# Patient Record
Sex: FEMALE | Race: WHITE | Employment: UNEMPLOYED | URBAN - NONMETROPOLITAN AREA
[De-identification: names, ages, dates, MRNs, and addresses within clinical notes are randomized per-mention and may not be internally consistent; named-entity substitution may affect disease eponyms.]

---

## 2020-06-17 ENCOUNTER — OFFICE VISIT (OUTPATIENT)
Dept: ENT CLINIC | Age: 65
End: 2020-06-17
Payer: MEDICARE

## 2020-06-17 VITALS
BODY MASS INDEX: 19.14 KG/M2 | DIASTOLIC BLOOD PRESSURE: 88 MMHG | HEIGHT: 63 IN | WEIGHT: 108 LBS | SYSTOLIC BLOOD PRESSURE: 138 MMHG | RESPIRATION RATE: 12 BRPM | TEMPERATURE: 97.1 F | HEART RATE: 80 BPM

## 2020-06-17 PROBLEM — H61.23 BILATERAL IMPACTED CERUMEN: Status: ACTIVE | Noted: 2020-06-17

## 2020-06-17 PROBLEM — H61.21 HEARING LOSS OF RIGHT EAR DUE TO CERUMEN IMPACTION: Status: ACTIVE | Noted: 2020-06-17

## 2020-06-17 PROBLEM — D16.4 OSTEOMA OF EXTERNAL AUDITORY CANAL: Status: ACTIVE | Noted: 2020-06-17

## 2020-06-17 PROCEDURE — 69210 REMOVE IMPACTED EAR WAX UNI: CPT | Performed by: OTOLARYNGOLOGY

## 2020-06-17 PROCEDURE — 4004F PT TOBACCO SCREEN RCVD TLK: CPT | Performed by: OTOLARYNGOLOGY

## 2020-06-17 PROCEDURE — G8427 DOCREV CUR MEDS BY ELIG CLIN: HCPCS | Performed by: OTOLARYNGOLOGY

## 2020-06-17 PROCEDURE — 1090F PRES/ABSN URINE INCON ASSESS: CPT | Performed by: OTOLARYNGOLOGY

## 2020-06-17 PROCEDURE — 4040F PNEUMOC VAC/ADMIN/RCVD: CPT | Performed by: OTOLARYNGOLOGY

## 2020-06-17 PROCEDURE — 99205 OFFICE O/P NEW HI 60 MIN: CPT | Performed by: OTOLARYNGOLOGY

## 2020-06-17 PROCEDURE — 3017F COLORECTAL CA SCREEN DOC REV: CPT | Performed by: OTOLARYNGOLOGY

## 2020-06-17 PROCEDURE — G8420 CALC BMI NORM PARAMETERS: HCPCS | Performed by: OTOLARYNGOLOGY

## 2020-06-17 PROCEDURE — G8400 PT W/DXA NO RESULTS DOC: HCPCS | Performed by: OTOLARYNGOLOGY

## 2020-06-17 PROCEDURE — 1123F ACP DISCUSS/DSCN MKR DOCD: CPT | Performed by: OTOLARYNGOLOGY

## 2020-06-17 RX ORDER — DULOXETIN HYDROCHLORIDE 30 MG/1
30 CAPSULE, DELAYED RELEASE ORAL DAILY
COMMUNITY

## 2020-06-17 NOTE — PROGRESS NOTES
the right ear. It was dry and hard and difficult to remove but I was able to do so without traumatizing her ear canal skin. Beyond it I saw normal external canal with a mildly dull tympanic membrane but no pathology seen in the middle ear structures which I could still make out. She had no bleeding in her canal.  Turning my attention to the left side I removed a large area of inspissated tenacious cerumen could now see the extent of the osteomas into the middle ear space. They could be fourth osteoma with this improved view. The patient tolerated the procedure well. Vitals reviewed. No results found. No results found for: NA, K, CL, CO2, BUN, CREATININE, CALCIUM, PROT, LABALBU, BILITOT, ALKPHOS, AST, ALT    All of the past medical history, past surgical history, family history,social history, allergies and current medications were reviewed with the patient. Assessment & Plan   Diagnoses and all orders for this visit:     Diagnosis Orders   1. Osteoma of external auditory canal  CT SELLA TURCICA WO CONTRAST   2. Bilateral impacted cerumen     3. 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 The middle ear extension is highly unusual so while this does not appear to be an acute process I recommended that we get a temporal bone CT scan to evaluate the medial anatomy of her temporal bone on that side and compared to the opposite side. Her  is very sick and in ICU in [place] so she is not sure when she can have that study so I placed the order for sometime in the next couple of months. She will get in touch with us and schedule this. I will then make contact with her by phone to relay the findings on the CT scan. I explained all of this in detail to the patient to her satisfaction.   She reported having her questions answered and her concerns addressed. She reported being pleased with the outcome of the visit today and being willing to proceed as such. No follow-ups on file. **This report has been created using voice recognition software. It may contain minor errors which are inherent in voice recognition technology. **